# Patient Record
Sex: FEMALE | Race: WHITE | NOT HISPANIC OR LATINO
[De-identification: names, ages, dates, MRNs, and addresses within clinical notes are randomized per-mention and may not be internally consistent; named-entity substitution may affect disease eponyms.]

---

## 2023-12-19 ENCOUNTER — NON-APPOINTMENT (OUTPATIENT)
Age: 63
End: 2023-12-19

## 2023-12-22 ENCOUNTER — APPOINTMENT (OUTPATIENT)
Dept: PAIN MANAGEMENT | Facility: CLINIC | Age: 63
End: 2023-12-22
Payer: MEDICARE

## 2023-12-22 ENCOUNTER — TRANSCRIPTION ENCOUNTER (OUTPATIENT)
Age: 63
End: 2023-12-22

## 2023-12-22 VITALS
HEART RATE: 73 BPM | OXYGEN SATURATION: 98 % | DIASTOLIC BLOOD PRESSURE: 83 MMHG | WEIGHT: 170 LBS | SYSTOLIC BLOOD PRESSURE: 150 MMHG | BODY MASS INDEX: 28.32 KG/M2 | HEIGHT: 65 IN

## 2023-12-22 DIAGNOSIS — I25.10 ATHEROSCLEROTIC HEART DISEASE OF NATIVE CORONARY ARTERY W/OUT ANGINA PECTORIS: ICD-10-CM

## 2023-12-22 DIAGNOSIS — I10 ESSENTIAL (PRIMARY) HYPERTENSION: ICD-10-CM

## 2023-12-22 DIAGNOSIS — E03.9 HYPOTHYROIDISM, UNSPECIFIED: ICD-10-CM

## 2023-12-22 DIAGNOSIS — Z78.9 OTHER SPECIFIED HEALTH STATUS: ICD-10-CM

## 2023-12-22 DIAGNOSIS — E78.00 PURE HYPERCHOLESTEROLEMIA, UNSPECIFIED: ICD-10-CM

## 2023-12-22 DIAGNOSIS — F32.9 MAJOR DEPRESSIVE DISORDER, SINGLE EPISODE, UNSPECIFIED: ICD-10-CM

## 2023-12-22 PROBLEM — Z00.00 ENCOUNTER FOR PREVENTIVE HEALTH EXAMINATION: Status: ACTIVE | Noted: 2023-12-22

## 2023-12-22 PROCEDURE — 99204 OFFICE O/P NEW MOD 45 MIN: CPT

## 2023-12-22 RX ORDER — ROSUVASTATIN CALCIUM 40 MG/1
40 TABLET, FILM COATED ORAL
Refills: 0 | Status: ACTIVE | COMMUNITY

## 2023-12-22 RX ORDER — TIZANIDINE 2 MG/1
2 TABLET ORAL
Qty: 45 | Refills: 0 | Status: ACTIVE | COMMUNITY
Start: 2023-12-22 | End: 1900-01-01

## 2023-12-22 RX ORDER — BUPROPION HYDROCHLORIDE 300 MG/1
300 TABLET, EXTENDED RELEASE ORAL
Refills: 0 | Status: ACTIVE | COMMUNITY

## 2023-12-22 RX ORDER — METOPROLOL SUCCINATE AND HYDROCHLOROTHIAZIDE 12.5; 5 MG/1; MG/1
50-12.5 TABLET ORAL
Refills: 0 | Status: ACTIVE | COMMUNITY

## 2023-12-22 RX ORDER — FLUOXETINE HYDROCHLORIDE 40 MG/1
40 CAPSULE ORAL
Refills: 0 | Status: ACTIVE | COMMUNITY

## 2023-12-22 RX ORDER — LEVOTHYROXINE SODIUM 75 UG/1
75 TABLET ORAL
Refills: 0 | Status: ACTIVE | COMMUNITY

## 2023-12-22 RX ORDER — COLCHICINE 0.6 MG/1
0.6 TABLET ORAL
Refills: 0 | Status: ACTIVE | COMMUNITY

## 2023-12-22 NOTE — ASSESSMENT
[FreeTextEntry1] : 63 yof w/ severe back and leg pain.  I have personally reviewed the patient's MRI in detail and discussed it with them which is significant for previous surgery and multiple levels of canal and foraminal stenosis.  Tizanidine 2 mg q8h prn.  Physical therapy prescribed - goal will be to increase ROM, strengthening, postural training, other modalities ad evens which may include massage and stim. Goals of therapy discussed with the patient in detail and will be discussed with physical therapist. Patient will follow-up following course of physical therapy to monitor progress and adjust therapy as needed.  Acetaminophen 1,000 mg q8h prn for moderate pain. Risks, benefits, and alternatives of acetaminophen discussed with patient.  Ibuprofen 600 mg q8h prn add when pain is not adequately controlled with acetaminophen. Risks, benefits, and alternatives of ibuprofen discussed with patient.  Diet and nutritional strategies discussed which may improve patients pain and will improve overall health.  MARCUS if pain persists.

## 2023-12-22 NOTE — HISTORY OF PRESENT ILLNESS
[Back Pain] : back pain [Constant] : constant [7] : a current pain level of 7/10 [6] : a minimum pain level of 6/10 [10] : a maximum pain level of 10/10 [Throbbing] : throbbing [Burning] : burning [Shooting] : shooting [Electric] : electric [Bending] : bending [Lifting] : lifting [Rest] : rest [FreeTextEntry1] : 63 yof presents w/ back pain. Her pain began in 2008 while she was working as a . In December 2014 she underwent spine surgery and reports feeling great. She had surgery again in August of 2015 and was doing well with that as well. Her pain began again after she moved. She reports that her worst pain is with standing on concrete. Pain radiates down the bilateral lower extremities, right far greater then left. Quality of life is impaired. There has been a severe exacerbation of the patient's chronic pain. She has done physical therapy throughout.

## 2023-12-22 NOTE — DATA REVIEWED
[FreeTextEntry1] : MRI Lumbar Spine: (05/2023):  L1-L2: moderate stenosis L2-L3: moderate central and moderate to severe foraminal stenosis L3-L4: moderate canal and moderate right with moderate to severe left foraminal stenosis L4-L5: annular tear with mild central stenosis with moderate bilateral foraminal stenossi L5-S1: left disc hernation with mild central and moderate left foraminal stenosis

## 2023-12-22 NOTE — PHYSICAL EXAM
[de-identified] : Constitutional: Well-developed, in no acute distress  Musculoskeletal: Lumbar Spine:   Gait: Antalgic 		Inspection: Normal curvature, no abnormal kyphosis or scoliosis 		Facet loading: pain bilaterally 		Palpation: 			Lumbar and paraspinal muscles: pain bilaterally 			Sacroiliac joint: no pain 			Greater trochanter: no pain 		Muscle Strength: 		Iliopsoas: 5/5 bilaterally 		Quadriceps: 5/5 bilaterally 		Hamstrings: 5/5 bilaterally 		Tibialis anterior: 5/5 bilaterally 		Extensor hallucis longus: 5/5 bilaterally  		Sensation: normal and equal in bilateral lower extremities  Extremity: no edema noted Neurological: Memory normal, AAO x 3, Cranial nerves II - XII grossly normal Psychiatric: Appropriate mood and affect, oriented to time, place, person, and situation

## 2024-01-22 ENCOUNTER — APPOINTMENT (OUTPATIENT)
Dept: PAIN MANAGEMENT | Facility: CLINIC | Age: 64
End: 2024-01-22
Payer: MEDICARE

## 2024-01-22 VITALS
WEIGHT: 170 LBS | DIASTOLIC BLOOD PRESSURE: 76 MMHG | HEIGHT: 65 IN | SYSTOLIC BLOOD PRESSURE: 120 MMHG | BODY MASS INDEX: 28.32 KG/M2

## 2024-01-22 PROCEDURE — 99214 OFFICE O/P EST MOD 30 MIN: CPT

## 2024-01-22 RX ORDER — GABAPENTIN 300 MG/1
300 CAPSULE ORAL 3 TIMES DAILY
Qty: 90 | Refills: 0 | Status: ACTIVE | COMMUNITY
Start: 2024-01-22 | End: 1900-01-01

## 2024-01-22 NOTE — PHYSICAL EXAM
[de-identified] : Constitutional: Well-developed, in no acute distress  Musculoskeletal: Lumbar Spine:   Gait: Antalgic 		Inspection: Normal curvature, no abnormal kyphosis or scoliosis 		Facet loading: pain bilaterally 		Palpation: 			Lumbar and paraspinal muscles: pain bilaterally 			Sacroiliac joint: no pain 			Greater trochanter: no pain 		Muscle Strength: 		Iliopsoas: 5/5 bilaterally 		Quadriceps: 5/5 bilaterally 		Hamstrings: 5/5 bilaterally 		Tibialis anterior: 5/5 bilaterally 		Extensor hallucis longus: 5/5 bilaterally  		Sensation: normal and equal in bilateral lower extremities  Extremity: no edema noted Neurological: Memory normal, AAO x 3, Cranial nerves II - XII grossly normal Psychiatric: Appropriate mood and affect, oriented to time, place, person, and situation

## 2024-01-22 NOTE — ASSESSMENT
[FreeTextEntry1] : 63 yof w/ severe back and leg pain. Pain has never been this bad before.   I have personally reviewed the patient's MRI in detail and discussed it with them which is significant for previous surgery and multiple levels of canal and foraminal stenosis.  The patient has failed to have relief with over six weeks of physical therapy within the last three months and all medications. Given their failure to improve with all other conservative measures recommend MRI lumbar spine. Patient will return to review imaging and plan for potential intervention. This is necessary as she now has new and severe pain down the opposite leg as previous.   Start gabapentin 300 mg TID for neuropathic pain.   Tizanidine 2 mg q8h prn.  Physical therapy prescribed - goal will be to increase ROM, strengthening, postural training, other modalities ad evens which may include massage and stim. Goals of therapy discussed with the patient in detail and will be discussed with physical therapist. Patient will follow-up following course of physical therapy to monitor progress and adjust therapy as needed.  Acetaminophen 1,000 mg q8h prn for moderate pain. Risks, benefits, and alternatives of acetaminophen discussed with patient.  Ibuprofen 600 mg q8h prn add when pain is not adequately controlled with acetaminophen. Risks, benefits, and alternatives of ibuprofen discussed with patient.  Diet and nutritional strategies discussed which may improve patients pain and will improve overall health.  Likely plan for MARCUS based on imaging results.

## 2024-01-22 NOTE — HISTORY OF PRESENT ILLNESS
[Back Pain] : back pain [7] : a current pain level of 7/10 [Constant] : constant [6] : a minimum pain level of 6/10 [10] : a maximum pain level of 10/10 [Throbbing] : throbbing [Burning] : burning [Shooting] : shooting [Electric] : electric [Bending] : bending [Lifting] : lifting [Rest] : rest [FreeTextEntry1] : Interval History: Patient returns for follow-up today. Her pain had improved, but then on January 13 her pain became severe again. This is the worst that it has been. Quality of life is impaired. There has been a severe exacerbation of the patient's chronic pain. Pain is now worse on the left then her right, which is rare.   HPI: 63 yof presents w/ back pain. Her pain began in 2008 while she was working as a . In December 2014 she underwent spine surgery and reports feeling great. She had surgery again in August of 2015 and was doing well with that as well. Her pain began again after she moved. She reports that her worst pain is with standing on concrete. Pain radiates down the bilateral lower extremities, right far greater then left. Quality of life is impaired. There has been a severe exacerbation of the patient's chronic pain. She has done physical therapy throughout.

## 2024-02-08 ENCOUNTER — APPOINTMENT (OUTPATIENT)
Dept: PAIN MANAGEMENT | Facility: CLINIC | Age: 64
End: 2024-02-08
Payer: MEDICARE

## 2024-02-08 VITALS
WEIGHT: 170 LBS | SYSTOLIC BLOOD PRESSURE: 122 MMHG | HEIGHT: 65 IN | DIASTOLIC BLOOD PRESSURE: 74 MMHG | BODY MASS INDEX: 28.32 KG/M2

## 2024-02-08 DIAGNOSIS — M79.10 MYALGIA, UNSPECIFIED SITE: ICD-10-CM

## 2024-02-08 DIAGNOSIS — M96.1 POSTLAMINECTOMY SYNDROME, NOT ELSEWHERE CLASSIFIED: ICD-10-CM

## 2024-02-08 PROCEDURE — 99214 OFFICE O/P EST MOD 30 MIN: CPT

## 2024-02-08 NOTE — HISTORY OF PRESENT ILLNESS
[Back Pain] : back pain [Constant] : constant [7] : a current pain level of 7/10 [6] : a minimum pain level of 6/10 [10] : a maximum pain level of 10/10 [Throbbing] : throbbing [Burning] : burning [Shooting] : shooting [Electric] : electric [Bending] : bending [Lifting] : lifting [Rest] : rest [FreeTextEntry1] : Interval History: Patient returns for follow-up today. Her pain had improved, but then on January 13 her pain became severe again. This is the worst that it has been. Quality of life is impaired. There has been a severe exacerbation of the patient's chronic pain. Pain is now worse on the left then her right, which is rare. She is seeing a spine surgeon regarding this.   HPI: 63 yof presents w/ back pain. Her pain began in 2008 while she was working as a . In December 2014 she underwent spine surgery and reports feeling great. She had surgery again in August of 2015 and was doing well with that as well. Her pain began again after she moved. She reports that her worst pain is with standing on concrete. Pain radiates down the bilateral lower extremities, right far greater then left. Quality of life is impaired. There has been a severe exacerbation of the patient's chronic pain. She has done physical therapy throughout.

## 2024-02-08 NOTE — ASSESSMENT
[FreeTextEntry1] : 63 yof w/ severe back and leg pain. Pain has never been this bad before.   I have personally reviewed the patient's MRI in detail and discussed it with them which is significant for previous surgery and multiple levels of canal and foraminal stenosis.  Continue gabapentin 300 mg TID for neuropathic pain.   Tizanidine 2 mg q8h prn.  Agree with seeing spine surgeon.   The patient has failed to have relief with medication management. The patient has failed to have relief with more then six weeks of physical therapy within the last three months. Given the patients failure to improve with all other conservative measures, recommend caudal epidural steroid injection under fluoroscopic guidance. The patient will follow-up with me in my office two weeks following intervention.  I have discussed in detail with the patient that an interventional spine procedure is associated with potential risks. The procedure may include an injection of steroid and potentially other medications (local anesthetic and normal saline) into the epidural space or surrounding tissue of the spine. There are significant risks of this procedure which include and are not limited to infection, bleeding, worsening pain, dural puncture leading to post-dural puncture headache, nerve damage, spinal cord injury, paralysis, stroke, and death. There is a chance that the procedure does not improve their pain. There are risks associated with the steroid being absorbed into the body systemically. These include dysphoria, difficulty sleeping, mood swings, and personality changes. Pre-menopausal women may notice a regularity his in her menstrual cycle for 2-3 months following the injection. Steroids can specifically affect patients with hypertension, diabetes, and peptic ulcers. The procedure may cause a temporary increase in blood pressure and blood glucose, and may adversely affect a peptic ulcer. Other, more rare complications, including avascular necrosis of the joints, glaucoma, and osteoporosis. I have discussed the risks of the procedure at length with the patient, and the potential benefits of pain relief. I have offered alternatives to the procedure. All questions were answered. The patient expressed understanding and wishes to proceed with the procedure.  Physical therapy prescribed - goal will be to increase ROM, strengthening, postural training, other modalities ad evens which may include massage and stim. Goals of therapy discussed with the patient in detail and will be discussed with physical therapist. Patient will follow-up following course of physical therapy to monitor progress and adjust therapy as needed.  Acetaminophen 1,000 mg q8h prn for moderate pain. Risks, benefits, and alternatives of acetaminophen discussed with patient.  Ibuprofen 600 mg q8h prn add when pain is not adequately controlled with acetaminophen. Risks, benefits, and alternatives of ibuprofen discussed with patient.  Diet and nutritional strategies discussed which may improve patients pain and will improve overall health.

## 2024-02-08 NOTE — PHYSICAL EXAM
[de-identified] : Constitutional: Well-developed, in no acute distress  Musculoskeletal: Lumbar Spine:   Gait: Antalgic 		Inspection: Normal curvature, no abnormal kyphosis or scoliosis 		Facet loading: pain bilaterally 		Palpation: 			Lumbar and paraspinal muscles: pain bilaterally 			Sacroiliac joint: no pain 			Greater trochanter: no pain 		Muscle Strength: 		Iliopsoas: 5/5 bilaterally 		Quadriceps: 5/5 bilaterally 		Hamstrings: 5/5 bilaterally 		Tibialis anterior: 5/5 bilaterally 		Extensor hallucis longus: 5/5 bilaterally  		Sensation: normal and equal in bilateral lower extremities  Extremity: no edema noted Neurological: Memory normal, AAO x 3, Cranial nerves II - XII grossly normal Psychiatric: Appropriate mood and affect, oriented to time, place, person, and situation

## 2024-02-26 ENCOUNTER — APPOINTMENT (OUTPATIENT)
Dept: PAIN MANAGEMENT | Facility: CLINIC | Age: 64
End: 2024-02-26
Payer: MEDICARE

## 2024-02-26 VITALS
BODY MASS INDEX: 28.32 KG/M2 | SYSTOLIC BLOOD PRESSURE: 132 MMHG | OXYGEN SATURATION: 98 % | HEART RATE: 63 BPM | RESPIRATION RATE: 16 BRPM | WEIGHT: 170 LBS | HEIGHT: 65 IN | DIASTOLIC BLOOD PRESSURE: 74 MMHG

## 2024-02-26 PROCEDURE — 62323 NJX INTERLAMINAR LMBR/SAC: CPT

## 2024-02-26 NOTE — PROCEDURE
[FreeTextEntry1] : PROCEDURE: Caudal epidural steroid injection under fluoroscopic guidance.  CHIEF COMPLAINT: Low back and leg pain.    PREOPERATIVE DIAGNOSIS:	Lumbar radiculopathy.  POSTOPERATIVE DIAGNOSIS:  Lumbar radiculopathy. 	  ANESTHESIA:  Local.  COMPLICATIONS:  	None.  ESTIMATED BLOOD LOSS:  None.	  INDICATIONS: Mrs. Cronin is a very pleasant 63 yof who has significant low back and leg pain.  The patient has failed to have relief with medication management and physical therapy. Given their failure to improve with all other conservative measures, it was determined that the patient would benefit from a caudal epidural steroid injection under fluoroscopic guidance.    The patient denies any fevers, chills, nausea, vomiting, diarrhea, constipation, chest pain, shortness of breath, or burning on urination.  They deny any latex allergy.  They are not taking any anticoagulants and do not have a history of coagulopathy.  The patient denies any IV contrast allergy.       PROCEDURE COURSE: The risks, benefits, and alternatives of the caudal epidural steroid injection were explained to the patient.  All questions were answered to their own satisfaction.  Written consent was signed and placed in the chart. The patients low back was marked in the preoperative holding area.   The patient was brought to the Operating Room and placed in the prone position with a pillow under the abdomen to reduce lumbar lordosis.  A time-out was taken identifying the patient, the procedure, the site and side, and the patients allergies.  ASA standard monitors were applied for monitoring throughout the procedure.  The patients back was prepped and draped with Chloroprep in the usual sterile fashion and sterile technique was adhered to throughout the entire procedure.  The lumbar spine was visualized under fluoroscopy in the AP view. The 12th rib and T12 vertebra were identified as a reference point and the lumbar vertebral bodies were counted.  The L5 vertebral body was then squared. The vertebral body and the superior and inferior end plates were aligned and the spinous processes were adjusted until midline. The sacral hiatus space was identified under fluoroscopic guidance and with manual palpation. The skin and subcutaneous tissues were infiltrated with 1% Lidocaine. After adequate local anesthesia was obtained, a 22 gauge quincke needle was advanced into the caudal space. This was confirmed under fluoroscopy in the lateral view. The needle was carefully advanced, alternating between AP and lateral views, to ensure appropriate trajectory and depth. Once the caudal space was accessed, negative aspiration for hem and CSF was obtained, further confirming location. Following this, 1 cc of contrast was administered and epidural spread was confirmed in the AP and lateral views. Following this, 80 mg of methylprednisolone and 2 cc of 1% lidocaine was slowly administered in incremental amounts.  All injections were done with negative aspiration for cerebrospinal fluid or heme, and all needles were withdrawn without incident. I personally met with the patient following the procedure and all questions were answered. The patient tolerated the procedure well without any apparent complications and was transferred to the Recovery Room in stable condition. The patient was provided with discharge instructions and will follow-up with me in my office.  	___________________________________ 	Chin Bell M.D.

## 2024-03-12 ENCOUNTER — APPOINTMENT (OUTPATIENT)
Dept: PAIN MANAGEMENT | Facility: CLINIC | Age: 64
End: 2024-03-12
Payer: MEDICARE

## 2024-03-12 VITALS
DIASTOLIC BLOOD PRESSURE: 90 MMHG | SYSTOLIC BLOOD PRESSURE: 146 MMHG | WEIGHT: 170 LBS | BODY MASS INDEX: 28.32 KG/M2 | HEART RATE: 70 BPM | HEIGHT: 65 IN | OXYGEN SATURATION: 99 %

## 2024-03-12 PROCEDURE — 99214 OFFICE O/P EST MOD 30 MIN: CPT

## 2024-03-12 NOTE — HISTORY OF PRESENT ILLNESS
[Back Pain] : back pain [Constant] : constant [7] : a current pain level of 7/10 [6] : a minimum pain level of 6/10 [10] : a maximum pain level of 10/10 [Throbbing] : throbbing [Shooting] : shooting [Burning] : burning [Electric] : electric [Bending] : bending [Lifting] : lifting [Rest] : rest [FreeTextEntry1] : Interval History: Patient returns for follow-up today. She has a new MRI and has seen Dr. Calvo's PA recently. Quality of life is impaired. There has been a severe exacerbation of the patient's chronic pain. She had 75% relief from her caudal MARCUS. She is seeing Dr. Tucker next week.   HPI: 63 yof presents w/ back pain. Her pain began in 2008 while she was working as a . In December 2014 she underwent spine surgery and reports feeling great. She had surgery again in August of 2015 and was doing well with that as well. Her pain began again after she moved. She reports that her worst pain is with standing on concrete. Pain radiates down the bilateral lower extremities, right far greater then left. Quality of life is impaired. There has been a severe exacerbation of the patient's chronic pain. She has done physical therapy throughout.   Interventions: Caudal (02/26/24):

## 2024-03-12 NOTE — DATA REVIEWED
[FreeTextEntry1] : EXAM: MR LUMBAR SPINE WITHOUT AND WITH CONTRAST  FINDINGS: Comparison: 1/26/2024. There is mild levoscoliosis. The height and alignment of the vertebral bodies are intact.  Pedicles are intact. There are right-sided postsurgical changes at L3-4 and L4-5.   Bone marrow signal is uniform.   T11-T12: Intervertebral disc intact.  T12-L1: Intervertebral disc intact.  L1-2: There is early endplate osteophyte formation and early facet osteoarthritis resulting in mild canal stenosis.  L2-3: There is early disc degeneration with endplate osteophyte formation and bilateral facet osteoarthritis. There is moderate to severe canal stenosis. There is mild neural foraminal stenosis.   There is  L3-4: There is early disc degeneration with anterior annular tear.   There is left greater than right facet osteoarthritis. There is moderate canal and neural foraminal stenosis.  L4-5: There is moderate disc degeneration with posterior annular tear. There is left greater than right facet osteoarthritis.  L5-S1: There is trace endplate osteophyte formation. There is early facet osteoarthritis.   There is no disc herniation or epidural mass.  There are no intradural masses. There is no nerve root clumping. There is no pathologic enhancement. The conus medullaris tapers normally. There is no paraspinal mass. The aorta is non-dilated.   IMPRESSION: Mild levoscoliosis. Right-sided postsurgical changes. Disc degeneration most severe L4-5. Annular tears at L3-4 and L4-5. Multilevel facet osteoarthritis. Canal stenosis most severe at L2-3 (moderate to severe). Mild to moderate neural foraminal stenosis without focal nerve root impingement. No disc herniation, fracture or arachnoiditis. Findings have not significantly changed since the prior study.

## 2024-03-12 NOTE — PHYSICAL EXAM
[de-identified] : Constitutional: Well-developed, in no acute distress  Musculoskeletal: Lumbar Spine:   Gait: Antalgic 		Inspection: Normal curvature, no abnormal kyphosis or scoliosis 		Facet loading: pain bilaterally 		Palpation: 			Lumbar and paraspinal muscles: pain bilaterally 			Sacroiliac joint: no pain 			Greater trochanter: no pain 		Muscle Strength: 		Iliopsoas: 5/5 bilaterally 		Quadriceps: 5/5 bilaterally 		Hamstrings: 5/5 bilaterally 		Tibialis anterior: 5/5 bilaterally 		Extensor hallucis longus: 5/5 bilaterally  		Sensation: normal and equal in bilateral lower extremities  Extremity: no edema noted Neurological: Memory normal, AAO x 3, Cranial nerves II - XII grossly normal Psychiatric: Appropriate mood and affect, oriented to time, place, person, and situation

## 2024-03-12 NOTE — ASSESSMENT
[FreeTextEntry1] : 63 yof w/ severe back and leg pain which has improved 75% with MARCUS.   I have personally reviewed the patient's MRI in detail and discussed it with them which is significant for previous surgery and multiple levels of canal and foraminal stenosis. There is moderate to severe canal stenosis at L2-L3.  Continue gabapentin 300 mg TID for neuropathic pain.   Tizanidine 2 mg q8h prn.  Agree with seeing spine surgeon.   Plan for left L2-L3 and L3-L4 TFESI if pain returns.  I have discussed in detail with the patient that an interventional spine procedure is associated with potential risks. The procedure may include an injection of steroid and potentially other medications (local anesthetic and normal saline) into the epidural space or surrounding tissue of the spine. There are significant risks of this procedure which include and are not limited to infection, bleeding, worsening pain, dural puncture leading to post-dural puncture headache, nerve damage, spinal cord injury, paralysis, stroke, and death. There is a chance that the procedure does not improve their pain. There are risks associated with the steroid being absorbed into the body systemically. These include dysphoria, difficulty sleeping, mood swings, and personality changes. Pre-menopausal women may notice a regularity his in her menstrual cycle for 2-3 months following the injection. Steroids can specifically affect patients with hypertension, diabetes, and peptic ulcers. The procedure may cause a temporary increase in blood pressure and blood glucose, and may adversely affect a peptic ulcer. Other, more rare complications, including avascular necrosis of the joints, glaucoma, and osteoporosis. I have discussed the risks of the procedure at length with the patient, and the potential benefits of pain relief. I have offered alternatives to the procedure. All questions were answered. The patient expressed understanding and wishes to proceed with the procedure.  Physical therapy prescribed - goal will be to increase ROM, strengthening, postural training, other modalities ad evens which may include massage and stim. Goals of therapy discussed with the patient in detail and will be discussed with physical therapist. Patient will follow-up following course of physical therapy to monitor progress and adjust therapy as needed.  Acetaminophen 1,000 mg q8h prn for moderate pain. Risks, benefits, and alternatives of acetaminophen discussed with patient.  Ibuprofen 600 mg q8h prn add when pain is not adequately controlled with acetaminophen. Risks, benefits, and alternatives of ibuprofen discussed with patient.  Diet and nutritional strategies discussed which may improve patients pain and will improve overall health.

## 2024-03-18 NOTE — PHYSICAL EXAM
[General Appearance - Alert] : alert [General Appearance - In No Acute Distress] : in no acute distress [Fluency] : fluency intact [Comprehension] : comprehension intact [Cranial Nerves Oculomotor (III)] : extraocular motion intact [Cranial Nerves Optic (II)] : visual acuity intact bilaterally,  pupils equal round and reactive to light [Cranial Nerves Trigeminal (V)] : facial sensation intact symmetrically [Cranial Nerves Facial (VII)] : face symmetrical [Cranial Nerves Vestibulocochlear (VIII)] : hearing was intact bilaterally [Cranial Nerves Glossopharyngeal (IX)] : tongue and palate midline [Cranial Nerves Accessory (XI - Cranial And Spinal)] : head turning and shoulder shrug symmetric [Cranial Nerves Hypoglossal (XII)] : there was no tongue deviation with protrusion [Motor Strength] : muscle strength was normal in all four extremities [Sensation Tactile Decrease] : light touch was intact [Abnormal Walk] : normal gait

## 2024-03-19 ENCOUNTER — APPOINTMENT (OUTPATIENT)
Dept: NEUROSURGERY | Facility: CLINIC | Age: 64
End: 2024-03-19
Payer: MEDICARE

## 2024-03-19 VITALS
WEIGHT: 161 LBS | OXYGEN SATURATION: 97 % | BODY MASS INDEX: 26.82 KG/M2 | HEART RATE: 74 BPM | DIASTOLIC BLOOD PRESSURE: 89 MMHG | HEIGHT: 65 IN | SYSTOLIC BLOOD PRESSURE: 153 MMHG

## 2024-03-19 PROCEDURE — 99205 OFFICE O/P NEW HI 60 MIN: CPT

## 2024-03-26 ENCOUNTER — TRANSCRIPTION ENCOUNTER (OUTPATIENT)
Age: 64
End: 2024-03-26

## 2024-03-28 NOTE — HISTORY OF PRESENT ILLNESS
[de-identified] : HILLARY FERRARA is a 63 year old female with a PMH of L3, L4, L5 spine surgery 2014 and 2015 (for back pain and tightness in legs), who presents to the office today for neurosurgical consultation at the request of Dr. Bell due to lumbar radiculopathy.   The pain is characterized as throbbing, burning, electric in nature.  It started over nine years ago. It is exacerbated by standing on concrete, prolonged walking, bending, lifting, worse in the morning, and relieved by rest and sitting in recliner.  It radiates down the lower back, gluteal, to bilateral lower extremities anterior to knee mostly and numbness in her toes, left greater than right. She has to sleep on right side. She can no longer walk prolonged distances without her legs feeling fatigued and has difficulty caring for her grandchildren. There has been no known trauma precipitating the pain.  The patient endorses numbness of extremities. Denies weakness of extremities, bowel or bladder incontinence and gait disturbance.  She has tried MARCUS (left L2-L3, L3-L4 on 2/26/24), PT and PT home exercises, and pain medications including Tizanidine, Ibuprofen, Tylenol, Gabapentin, in the last months without relief.  She has undergone imaging in the form of MRI lumbar spine which revealed areas of stenosis, most notable at the L2-L3, L3-L4 levels (see detailed report below)  Surg Hx: Previous lumbar laminectomies and discectomies (right sided L3-4 and L4-5) Meds:  Gabapentin, Tizanidine, Buproprian, Colchicine, Fluoxetine, Metoprolol-HCTZ, Rosuvastatin, Synthroid  Allergies: NKDA  Soc Hx: nonsmoker, occasional EtOH, lives with , works as  retired in 2018

## 2024-03-28 NOTE — ASSESSMENT
[FreeTextEntry1] : HILLARY FERRARA is a 63 year old female with a history of prior lumbar decompression surgery who is presenting due to lumbar radiculopathy and neurogenic claudication. For the last 9 years she has been suffering from pain in her low back that radiates to her bilateral lower extremities and into her toes (left greater than right).  She feels that she is limited in the distance that she can walk after which she needs to rest due to her legs feeling fatigued.  She has had difficulty caring for her grandchildren.  She has a history of lumbar decompression surgeries in 2014 and 2015 (L3-4 and L4-5) by another surgeon.   We reviewed her MRI and x-rays in the office today using the spine model to aid in interpretation.  Her MRI demonstrates multilevel lumbar spondylosis with mild degenerative levoscoliosis as well as postsurgical changes on the right side at L3-4 and L4-5.  She has severe spinal canal stenosis secondary to facet arthropathy and disc herniation as well as mild to moderate neuroforaminal stenosis at L2-3. At L3-4 there is an annular tear and disc protrusion as well as facet arthropathy leading to moderate canal and neuroforaminal stenosis.  She also has lumbar spine x-rays again demonstrating multilevel lumbar spondylosis.  There is a grade 1 L3 on L4 anterolisthesis with flexion which reduces on extension.  I have discussed the natural history and treatment options for neurogenic claudication and radiculopathy due to lumbar spinal stenosis with the patient. I explained the indications for observation, conservative management, medical management, physical therapy, pain management approaches and surgery. I explained the different types and surgical approaches including anterior and posterior approaches as well as decompression only procedures and instrumented fusions. I discussed possible surgeries including lumbar L2-L4 laminectomy, discectomy, and facetectomy +/- instrumented fusion.    In the end, I recommend additional imaging in the form of CT lumbar spine to better visualize the extent of osteophyte complex development, facet arthropathy, and post-surgical changes.   The patient may return to the office once imaging completed for further treatment planning. She was also advised to reach out to us sooner if she is to develop weakness or bowel/bladder symptoms.  I spent 60 minutes relative to this patient encounter.

## 2024-03-28 NOTE — CONSULT LETTER
[Dear  ___] : Dear  [unfilled], [Consult Letter:] : I had the pleasure of evaluating your patient, [unfilled]. [Consult Closing:] : Thank you very much for allowing me to participate in the care of this patient.  If you have any questions, please do not hesitate to contact me. [Please see my note below.] : Please see my note below. [Sincerely,] : Sincerely, [FreeTextEntry3] : Alfie Tucker M.D.

## 2024-03-30 ENCOUNTER — RESULT REVIEW (OUTPATIENT)
Age: 64
End: 2024-03-30

## 2024-03-31 NOTE — PHYSICAL EXAM
[General Appearance - Alert] : alert [General Appearance - In No Acute Distress] : in no acute distress [Comprehension] : comprehension intact [Fluency] : fluency intact [Cranial Nerves Oculomotor (III)] : extraocular motion intact [Cranial Nerves Optic (II)] : visual acuity intact bilaterally,  pupils equal round and reactive to light [Cranial Nerves Vestibulocochlear (VIII)] : hearing was intact bilaterally [Cranial Nerves Trigeminal (V)] : facial sensation intact symmetrically [Cranial Nerves Facial (VII)] : face symmetrical [Cranial Nerves Glossopharyngeal (IX)] : tongue and palate midline [Cranial Nerves Accessory (XI - Cranial And Spinal)] : head turning and shoulder shrug symmetric [Cranial Nerves Hypoglossal (XII)] : there was no tongue deviation with protrusion [Motor Strength] : muscle strength was normal in all four extremities [Sensation Tactile Decrease] : light touch was intact [Abnormal Walk] : normal gait

## 2024-04-02 ENCOUNTER — APPOINTMENT (OUTPATIENT)
Dept: NEUROSURGERY | Facility: CLINIC | Age: 64
End: 2024-04-02
Payer: MEDICARE

## 2024-04-02 VITALS
BODY MASS INDEX: 26.82 KG/M2 | DIASTOLIC BLOOD PRESSURE: 87 MMHG | SYSTOLIC BLOOD PRESSURE: 136 MMHG | WEIGHT: 161 LBS | OXYGEN SATURATION: 99 % | HEIGHT: 65 IN | HEART RATE: 75 BPM

## 2024-04-02 DIAGNOSIS — M47.817 SPONDYLOSIS W/OUT MYELOPATHY OR RADICULOPATHY, LUMBOSACRAL REGION: ICD-10-CM

## 2024-04-02 PROCEDURE — 99215 OFFICE O/P EST HI 40 MIN: CPT

## 2024-04-02 NOTE — CONSULT LETTER
[Dear  ___] : Dear  [unfilled], [Please see my note below.] : Please see my note below. [Consult Letter:] : I had the pleasure of evaluating your patient, [unfilled]. [Consult Closing:] : Thank you very much for allowing me to participate in the care of this patient.  If you have any questions, please do not hesitate to contact me. [Sincerely,] : Sincerely, [FreeTextEntry3] : Alfie Tucker M.D.

## 2024-04-02 NOTE — HISTORY OF PRESENT ILLNESS
[de-identified] : Baylee Ferrara presents for interval follow up and imaging review for lumbar radiculopathy. She continues to have severe pain in her lower back, left greater than right side, radiating into her thighs. Her numbness partially improved with MARCUS. She additionally has the pain radiating down her posterior left leg and into the foot. Denies bowel or bladder difficulties.  She has been doing yoga and home PT exercises.   She underwent CT lumbar spine.  She underwent x-rays of the lumbar spine which demonstrates multilevel mild to moderate spondylosis. Mild grade 1 anterolisthesis of L3 on L4 with flexion which decreases slightly with extension.   3/19/24: BAYLEE FERRARA is a 63 year old female with a PMH of L3, L4, L5 spine surgery 2014 and 2015 (for back pain and tightness in legs), who presents to the office today for neurosurgical consultation at the request of Dr. Bell due to lumbar radiculopathy.   The pain is characterized as throbbing, burning, electric in nature.  It started over nine years ago. It is exacerbated by standing on concrete, prolonged walking, bending, lifting, worse in the morning, and relieved by rest and sitting in recliner.  It radiates down the lower back, gluteal, to bilateral lower extremities anterior to knee mostly and numbness in her toes, left greater than right. She has to sleep on right side. She can no longer walk prolonged distances without her legs feeling fatigued and has difficulty caring for her grandchildren. There has been no known trauma precipitating the pain.  The patient endorses numbness of extremities. Denies weakness of extremities, bowel or bladder incontinence and gait disturbance.  She has tried MARCUS (left L2-L3, L3-L4 on 2/26/24), PT and PT home exercises, and pain medications including Tizanidine, Ibuprofen, Tylenol, Gabapentin, in the last months without relief.  She has undergone imaging in the form of MRI lumbar spine which revealed areas of stenosis, most notable at the L2-L3, L3-L4 levels (see detailed report below)  cardiologist: Gerardo Clay [facility]   Surg Hx: Previous lumbar laminectomies and discectomies (right sided L3-4 and L4-5) Meds:  Buproprian, Colchicine, Fluoxetine, Metoprolol 50 mg, Rosuvastatin 20 mg, Synthroid, Repatha, Vitamin D Allergies: NKDA  Soc Hx: nonsmoker, occasional EtOH, lives with , works as  retired in 2018

## 2024-04-02 NOTE — ASSESSMENT
[FreeTextEntry1] : HILLARY FERRARA is a 63 year old female with a history of prior lumbar decompression surgery in 2014 and 2015 (L3-4 and L4-5) who is presenting due to lumbar radiculopathy and neurogenic claudication. For the last 9 years she has been suffering from pain in her low back that radiates to her bilateral lower extremities and into her toes (left greater than right). The most severe pain currently is into her anterior thigh however she also has pain down her posterolateral left leg. She feels that she is limited in the distance that she can walk after which she needs to rest due to her legs feeling fatigued. She also notices the inability to stand for prolonged periods of time. She has had difficulty caring for her grandchildren.  We reviewed her MRI, CT and x-rays in the office today using the spine model to aid in interpretation. Her MRI demonstrates multilevel lumbar spondylosis with mild degenerative levoscoliosis as well as postsurgical changes on the right side at L3-4 and L4-5. She has severe spinal canal stenosis secondary to facet arthropathy and disc herniation as well as mild to moderate neuroforaminal stenosis at L2-3. At L3-4 there is an annular tear and disc protrusion as well as facet arthropathy leading to moderate canal and neuroforaminal stenosis.  At L4-5 there is moderate degenerative disc disease with posterior disc herniation as well as significant facet arthropathy leading to left greater than right foraminal stenosis.  She also has lumbar spine x-rays again demonstrating multilevel lumbar spondylosis.  There is a grade 1 L3 on L4 anterolisthesis with flexion which reduces on extension.  Her CT scan redemonstrates the multilevel lumbar spondylosis as well as postsurgical changes and severe facet arthropathy at L2-L5.  I have discussed the natural history and treatment options for neurogenic claudication and radiculopathy due to lumbar spinal stenosis with the patient. I explained the indications for observation, conservative management, medical management, physical therapy, pain management approaches and surgery. I explained the different types and surgical approaches including anterior and posterior approaches as well as decompression only procedures and instrumented fusions.  The risks of surgery were discussed in detail including but not limited to postoperative infection at the surgical site, hospital acquired pneumonia, hospital acquired urinary tract infection, postoperative meningitis, wound dehiscence, CSF leak, stroke (ischemic and hemorrhagic), postoperative seizures, worsening motor function due to spinal cord or nerve injury, postoperative visual deficit which could be permanent (blindness) when surgery is performed in a prone position, cardiovascular complications (MI, PE, DVT) and I also explained that some of these complications could lead to sepsis, coma or even death.  In the end, I recommend a lumbar L2-L5 posterolateral instrumented fusion and arthrodesis with L2-5 bilateral laminectomies, foraminotomies, facetectomies ,and an L2-3 discectomy.  The patient will need cardiac clearance (sees cardiologist in [place], [place]) and will need NP clearance with the Presurgical Testing Department at [place] prior to the procedure.  At the end of the discussion, the patient opted to move forward with the above plan.  Our office will reach out to coordinate a surgical date in the coming weeks.  The patient understands the plan of care and is in agreement.  All questions answered to patient satisfaction.   I spent 45 minutes relative to this patient encounter.

## 2024-04-02 NOTE — DATA REVIEWED
[de-identified] : 3/30 MRI lumbar spine [de-identified] : 1/29/24 MRI lumbar spine-see report  [de-identified] : 2/24/24 xrays lumbar spine-see scanned report

## 2024-04-11 ENCOUNTER — RESULT REVIEW (OUTPATIENT)
Age: 64
End: 2024-04-11

## 2024-04-24 ENCOUNTER — RESULT REVIEW (OUTPATIENT)
Age: 64
End: 2024-04-24

## 2024-04-24 ENCOUNTER — APPOINTMENT (OUTPATIENT)
Dept: NEUROSURGERY | Facility: HOSPITAL | Age: 64
End: 2024-04-24

## 2024-04-28 ENCOUNTER — TRANSCRIPTION ENCOUNTER (OUTPATIENT)
Age: 64
End: 2024-04-28

## 2024-05-01 NOTE — PHYSICAL EXAM
[General Appearance - Alert] : alert [General Appearance - In No Acute Distress] : in no acute distress [Fluency] : fluency intact [Comprehension] : comprehension intact [Cranial Nerves Optic (II)] : visual acuity intact bilaterally,  pupils equal round and reactive to light [Cranial Nerves Oculomotor (III)] : extraocular motion intact [Cranial Nerves Trigeminal (V)] : facial sensation intact symmetrically [Cranial Nerves Facial (VII)] : face symmetrical [Cranial Nerves Vestibulocochlear (VIII)] : hearing was intact bilaterally [Cranial Nerves Glossopharyngeal (IX)] : tongue and palate midline [Cranial Nerves Accessory (XI - Cranial And Spinal)] : head turning and shoulder shrug symmetric [Cranial Nerves Hypoglossal (XII)] : there was no tongue deviation with protrusion [Motor Strength] : muscle strength was normal in all four extremities [Sensation Tactile Decrease] : light touch was intact [Abnormal Walk] : normal gait

## 2024-05-03 ENCOUNTER — APPOINTMENT (OUTPATIENT)
Dept: NEUROSURGERY | Facility: CLINIC | Age: 64
End: 2024-05-03
Payer: MEDICARE

## 2024-05-03 DIAGNOSIS — Z98.890 OTHER SPECIFIED POSTPROCEDURAL STATES: ICD-10-CM

## 2024-05-03 DIAGNOSIS — G89.18 OTHER ACUTE POSTPROCEDURAL PAIN: ICD-10-CM

## 2024-05-03 PROCEDURE — 99024 POSTOP FOLLOW-UP VISIT: CPT

## 2024-05-03 RX ORDER — OXYCODONE 5 MG/1
5 TABLET ORAL
Qty: 42 | Refills: 0 | Status: ACTIVE | COMMUNITY
Start: 2024-05-03 | End: 1900-01-01

## 2024-05-03 NOTE — DATA REVIEWED
[de-identified] : 3/30 MRI lumbar spine [de-identified] : 1/29/24 MRI lumbar spine-see report  [de-identified] : 2/24/24 xrays lumbar spine-see scanned report

## 2024-05-03 NOTE — CONSULT LETTER
[Dear  ___] : Dear  [unfilled], [Consult Letter:] : I had the pleasure of evaluating your patient, [unfilled]. [Please see my note below.] : Please see my note below. [Consult Closing:] : Thank you very much for allowing me to participate in the care of this patient.  If you have any questions, please do not hesitate to contact me. [Sincerely,] : Sincerely, [FreeTextEntry3] : Alfie Tucker M.D.

## 2024-05-03 NOTE — REASON FOR VISIT
[de-identified] : Lumbar L2-L5 laminectomies, facetectomies, and foraminotomies, L2-3 discectomy, L2-5 posterolateral instrumented fusion and arthrodesis  [de-identified] : 4/24/24  [de-identified] : 9

## 2024-05-03 NOTE — ASSESSMENT
[FreeTextEntry1] : HILLARY FERRARA is a 63 year old female with a history of prior lumbar decompression surgery in 2014 and 2015 (L3-4 and L4-5) who initially presented to my office due to lumbar radiculopathy and neurogenic claudication. For the last 9 years she has been suffering from pain in her low back that radiates to her bilateral lower extremities and into her toes (left greater than right). The most severe pain currently is into her anterior thigh however she also has pain down her posterolateral left leg. She feels that she is limited in the distance that she can walk after which she needs to rest due to her legs feeling fatigued. She also notices the inability to stand for prolonged periods of time. She has had difficulty caring for her grandchildren. She is now s/p Lumbar L2-L5 laminectomies, facetectomies, and foraminotomies, L2-3 discectomy, L2-5 posterolateral instrumented fusion and arthrodesis. She is noted to have weak, but present, rectal tone on exam, with diminished sensation.   We will continue to monitor her rectal tone and brenna-anal sensation. She has been doing well since her lumbar L2-L5 laminectomies, facetectomies, and foraminotomies, L2-5 posterolateral instrumented fusion and arthrodesis on 4/24/24.  She was instructed to continue using fragrance-free shampoo to clean incision twice daily. She should continue the current pain regimen.  I have provided a prescription for outpatient PT.  She should return to the office in six weeks time for a progress check with xrays prior. We will touch base next week as well via phone.  The patient understands the plan of care and is in agreement with it.    I spent 30 minutes relative to this patient encounter.

## 2024-05-03 NOTE — HISTORY OF PRESENT ILLNESS
[de-identified] : Baylee Ferrara presents s/p Lumbar L2-L5 laminectomies, facetectomies, and foraminotomies, L2-5 posterolateral instrumented fusion and arthrodesis on 4/24/24. She has been doing well post op and taking the pain medication with positive relief. She notes numbness in the perianal area with difficulty going to the bathroom requiring multiple laxatives. Denies incontinence of bowel or bladder.  The patient denies new pain, numbness, tingling, weakness, gait disturbance, fever, chills, drainage from incision, redness at incision site.  4/2/24: Baylee Ferrara presents for interval follow up and imaging review for lumbar radiculopathy. She continues to have severe pain in her lower back, left greater than right side, radiating into her thighs. Her numbness partially improved with MARCUS. She additionally has the pain radiating down her posterior left leg and into the foot. Denies bowel or bladder difficulties.  She has been doing yoga and home PT exercises.   She underwent CT lumbar spine.  She underwent x-rays of the lumbar spine which demonstrates multilevel mild to moderate spondylosis. Mild grade 1 anterolisthesis of L3 on L4 with flexion which decreases slightly with extension.   3/19/24: BAYLEE FERRARA is a 63 year old female with a PMH of L3, L4, L5 spine surgery 2014 and 2015 (for back pain and tightness in legs), who presents to the office today for neurosurgical consultation at the request of Dr. Bell due to lumbar radiculopathy.   The pain is characterized as throbbing, burning, electric in nature.  It started over nine years ago. It is exacerbated by standing on concrete, prolonged walking, bending, lifting, worse in the morning, and relieved by rest and sitting in recliner.  It radiates down the lower back, gluteal, to bilateral lower extremities anterior to knee mostly and numbness in her toes, left greater than right. She has to sleep on right side. She can no longer walk prolonged distances without her legs feeling fatigued and has difficulty caring for her grandchildren. There has been no known trauma precipitating the pain.  The patient endorses numbness of extremities. Denies weakness of extremities, bowel or bladder incontinence and gait disturbance.  She has tried MARCUS (left L2-L3, L3-L4 on 2/26/24), PT and PT home exercises, and pain medications including Tizanidine, Ibuprofen, Tylenol, Gabapentin, in the last months without relief.  She has undergone imaging in the form of MRI lumbar spine which revealed areas of stenosis, most notable at the L2-L3, L3-L4 levels (see detailed report below)  cardiologist: Gerardo [facility]   Surg Hx: Previous lumbar laminectomies and discectomies (right sided L3-4 and L4-5) Meds:  Buproprian, Colchicine, Fluoxetine, Metoprolol 50 mg, Rosuvastatin 20 mg, Synthroid, Repatha, Vitamin D Allergies: NKDA  Soc Hx: nonsmoker, occasional EtOH, lives with , works as  retired in 2018

## 2024-05-08 RX ORDER — METHOCARBAMOL 500 MG/1
500 TABLET, FILM COATED ORAL
Qty: 56 | Refills: 0 | Status: ACTIVE | COMMUNITY
Start: 2024-05-08 | End: 1900-01-01

## 2024-06-10 ENCOUNTER — NON-APPOINTMENT (OUTPATIENT)
Age: 64
End: 2024-06-10

## 2024-06-11 ENCOUNTER — RESULT REVIEW (OUTPATIENT)
Age: 64
End: 2024-06-11

## 2024-06-11 ENCOUNTER — APPOINTMENT (OUTPATIENT)
Dept: NEUROSURGERY | Facility: CLINIC | Age: 64
End: 2024-06-11
Payer: MEDICARE

## 2024-06-11 VITALS
SYSTOLIC BLOOD PRESSURE: 135 MMHG | BODY MASS INDEX: 26.63 KG/M2 | HEART RATE: 78 BPM | WEIGHT: 156 LBS | HEIGHT: 64 IN | DIASTOLIC BLOOD PRESSURE: 86 MMHG | OXYGEN SATURATION: 98 %

## 2024-06-11 DIAGNOSIS — M54.16 RADICULOPATHY, LUMBAR REGION: ICD-10-CM

## 2024-06-11 DIAGNOSIS — Z98.1 ARTHRODESIS STATUS: ICD-10-CM

## 2024-06-11 DIAGNOSIS — G89.4 CHRONIC PAIN SYNDROME: ICD-10-CM

## 2024-06-11 DIAGNOSIS — M48.062 SPINAL STENOSIS, LUMBAR REGION WITH NEUROGENIC CLAUDICATION: ICD-10-CM

## 2024-06-11 PROCEDURE — 99024 POSTOP FOLLOW-UP VISIT: CPT

## 2024-06-11 NOTE — DATA REVIEWED
[de-identified] : 3/30 MRI lumbar spine [de-identified] : 1/29/24 MRI lumbar spine-see report  [de-identified] : 2/24/24 xrays lumbar spine-see scanned report

## 2024-06-11 NOTE — REASON FOR VISIT
[de-identified] : 7 [de-identified] : Lumbar L2-L5 laminectomies, facetectomies, and foraminotomies, L2-3 discectomy, L2-5 posterolateral instrumented fusion and arthrodesis  [de-identified] : 4/24/24  [de-identified] : 7

## 2024-06-11 NOTE — HISTORY OF PRESENT ILLNESS
[de-identified] : Baylee Ferrara presents s/p lumbar L2-L5 laminectomies, facetectomies, and foraminotomies, L2-L5 posterolateral instrumented fusion and arthrodesis on 4/24/24. The numbness in the perianal area has partially improved since previous visit. We reviewed her repeat x-rays that were obtained prior to her appointment today. She has not yet started outpatient PT. She suffered a fall without injury two weeks ago. She reports tingling in her right thigh and lower extremities and tingling in left arm. The patient denies new pain, numbness, tingling, weakness, bowel/bladder dysfunction, gait disturbance, fever, chills, drainage from incision, redness at incision site.  5/3/24: Baylee Ferrara presents s/p Lumbar L2-L5 laminectomies, facetectomies, and foraminotomies, L2-5 posterolateral instrumented fusion and arthrodesis on 4/24/24. She has been doing well post op and taking the pain medication with positive relief. She notes numbness in the perianal area with difficulty going to the bathroom requiring multiple laxatives. Denies incontinence of bowel or bladder.  The patient denies new pain, numbness, tingling, weakness, gait disturbance, fever, chills, drainage from incision, redness at incision site.  4/2/24: Baylee Ferrara presents for interval follow up and imaging review for lumbar radiculopathy. She continues to have severe pain in her lower back, left greater than right side, radiating into her thighs. Her numbness partially improved with MARCUS. She additionally has the pain radiating down her posterior left leg and into the foot. Denies bowel or bladder difficulties.  She has been doing yoga and home PT exercises.   She underwent CT lumbar spine.  She underwent x-rays of the lumbar spine which demonstrates multilevel mild to moderate spondylosis. Mild grade 1 anterolisthesis of L3 on L4 with flexion which decreases slightly with extension.   3/19/24: BAYLEE FERRARA is a 63 year old female with a PMH of L3, L4, L5 spine surgery 2014 and 2015 (for back pain and tightness in legs), who presents to the office today for neurosurgical consultation at the request of Dr. Bell due to lumbar radiculopathy.   The pain is characterized as throbbing, burning, electric in nature.  It started over nine years ago. It is exacerbated by standing on concrete, prolonged walking, bending, lifting, worse in the morning, and relieved by rest and sitting in recliner.  It radiates down the lower back, gluteal, to bilateral lower extremities anterior to knee mostly and numbness in her toes, left greater than right. She has to sleep on right side. She can no longer walk prolonged distances without her legs feeling fatigued and has difficulty caring for her grandchildren. There has been no known trauma precipitating the pain.  The patient endorses numbness of extremities. Denies weakness of extremities, bowel or bladder incontinence and gait disturbance.  She has tried MARCUS (left L2-L3, L3-L4 on 2/26/24), PT and PT home exercises, and pain medications including Tizanidine, Ibuprofen, Tylenol, Gabapentin, in the last months without relief.  She has undergone imaging in the form of MRI lumbar spine which revealed areas of stenosis, most notable at the L2-L3, L3-L4 levels (see detailed report below)  cardiologist: Gerardo Clay [facility]   Surg Hx: Previous lumbar laminectomies and discectomies (right sided L3-4 and L4-5) Meds:  Buproprian, Colchicine, Fluoxetine, Metoprolol 50 mg, Rosuvastatin 20 mg, Synthroid, Repatha, Vitamin D Allergies: NKDA  Soc Hx: nonsmoker, occasional EtOH, lives with , works as  retired in 2018

## 2024-06-11 NOTE — ASSESSMENT
[FreeTextEntry1] : HILLARY FERRARA is a 63 year old female with a history of prior lumbar decompression surgery in 2014 and 2015 (L3-4 and L4-5) who initially presented to my office due to lumbar radiculopathy and neurogenic claudication. She is now s/p lumbar L2-L5 laminectomies, facetectomies, and foraminotomies, L2-3 discectomy, L2-5 posterolateral instrumented fusion and arthrodesis.  She was previously noted to have weak, but present, rectal tone on exam, with diminished sensation. She now reports some increased rectal tone and sensation.   We reviewed her latest x-rays in the office today. Her overall lumbar lordosis is well maintained and there are no issues seen with her hardware.   We will continue to monitor her left arm tingling sensation.  We will continue to monitor her rectal tone and brenna-anal sensation. She has been doing well since her lumbar L2-L5 laminectomies, facetectomies, and foraminotomies, L2-5 posterolateral instrumented fusion and arthrodesis on 4/24/24.  She was instructed to continue using fragrance-free shampoo to clean incision twice daily. She should continue the current pain regimen.  I have provided a prescription for outpatient PT.  She should return to the office in six weeks time for a progress check with xrays prior.  The patient understands the plan of care and is in agreement with it.    I spent 30 minutes relative to this patient encounter.

## 2024-07-23 ENCOUNTER — APPOINTMENT (OUTPATIENT)
Dept: NEUROSURGERY | Facility: CLINIC | Age: 64
End: 2024-07-23
Payer: MEDICARE

## 2024-07-23 ENCOUNTER — RESULT REVIEW (OUTPATIENT)
Age: 64
End: 2024-07-23

## 2024-07-23 VITALS
HEART RATE: 96 BPM | OXYGEN SATURATION: 98 % | HEIGHT: 64 IN | BODY MASS INDEX: 26.63 KG/M2 | SYSTOLIC BLOOD PRESSURE: 138 MMHG | WEIGHT: 156 LBS | DIASTOLIC BLOOD PRESSURE: 80 MMHG

## 2024-07-23 DIAGNOSIS — M48.062 SPINAL STENOSIS, LUMBAR REGION WITH NEUROGENIC CLAUDICATION: ICD-10-CM

## 2024-07-23 DIAGNOSIS — Z98.890 OTHER SPECIFIED POSTPROCEDURAL STATES: ICD-10-CM

## 2024-07-23 DIAGNOSIS — Z98.1 ARTHRODESIS STATUS: ICD-10-CM

## 2024-07-23 DIAGNOSIS — M54.16 RADICULOPATHY, LUMBAR REGION: ICD-10-CM

## 2024-07-23 PROCEDURE — 99024 POSTOP FOLLOW-UP VISIT: CPT

## 2024-07-24 NOTE — HISTORY OF PRESENT ILLNESS
[de-identified] : Baylee Ferrara presents s/p status post  L2-L5 laminectomies, facetectomies, and foraminotomies, L2-L5 posterolateral instrumented fusion and arthrodesis on 4/24/24. The numbness in her perianal area continues to improve since prior visit.  We reviewed her repeat x-rays prior to her appointment today. She is doing well with PT. She has intermittent tingling in the right lateral thigh and right lower extremity has been ongoing for a few weeks requiring Advil 800 mg for relief and PT. Her left leg feels stronger. She is doing more activity. Using heat and cold packs. The patient denies new pain, numbness, tingling, weakness, bowel/bladder dysfunction, gait disturbance, fever, chills, drainage from incision, redness at incision site.  6/11/24: Baylee Ferrara presents s/p lumbar L2-L5 laminectomies, facetectomies, and foraminotomies, L2-L5 posterolateral instrumented fusion and arthrodesis on 4/24/24. The numbness in the perianal area has partially improved since previous visit. We reviewed her repeat x-rays that were obtained prior to her appointment today. She has not yet started outpatient PT. She suffered a fall without injury two weeks ago. She reports tingling in her right thigh and lower extremities and tingling in left arm. The patient denies new pain, numbness, tingling, weakness, bowel/bladder dysfunction, gait disturbance, fever, chills, drainage from incision, redness at incision site.  5/3/24: Baylee Ferrara presents s/p Lumbar L2-L5 laminectomies, facetectomies, and foraminotomies, L2-5 posterolateral instrumented fusion and arthrodesis on 4/24/24. She has been doing well post op and taking the pain medication with positive relief. She notes numbness in the perianal area with difficulty going to the bathroom requiring multiple laxatives. Denies incontinence of bowel or bladder.  The patient denies new pain, numbness, tingling, weakness, gait disturbance, fever, chills, drainage from incision, redness at incision site.  4/2/24: Baylee Ferrara presents for interval follow up and imaging review for lumbar radiculopathy. She continues to have severe pain in her lower back, left greater than right side, radiating into her thighs. Her numbness partially improved with MARCUS. She additionally has the pain radiating down her posterior left leg and into the foot. Denies bowel or bladder difficulties.  She has been doing yoga and home PT exercises.   She underwent CT lumbar spine.  She underwent x-rays of the lumbar spine which demonstrates multilevel mild to moderate spondylosis. Mild grade 1 anterolisthesis of L3 on L4 with flexion which decreases slightly with extension.   3/19/24: BAYLEE FERRARA is a 63 year old female with a PMH of L3, L4, L5 spine surgery 2014 and 2015 (for back pain and tightness in legs), who presents to the office today for neurosurgical consultation at the request of Dr. Bell due to lumbar radiculopathy.   The pain is characterized as throbbing, burning, electric in nature.  It started over nine years ago. It is exacerbated by standing on concrete, prolonged walking, bending, lifting, worse in the morning, and relieved by rest and sitting in recliner.  It radiates down the lower back, gluteal, to bilateral lower extremities anterior to knee mostly and numbness in her toes, left greater than right. She has to sleep on right side. She can no longer walk prolonged distances without her legs feeling fatigued and has difficulty caring for her grandchildren. There has been no known trauma precipitating the pain.  The patient endorses numbness of extremities. Denies weakness of extremities, bowel or bladder incontinence and gait disturbance.  She has tried MARCUS (left L2-L3, L3-L4 on 2/26/24), PT and PT home exercises, and pain medications including Tizanidine, Ibuprofen, Tylenol, Gabapentin, in the last months without relief.  She has undergone imaging in the form of MRI lumbar spine which revealed areas of stenosis, most notable at the L2-L3, L3-L4 levels (see detailed report below)  cardiologist: Gerardo [facility]   Surg Hx: Previous lumbar laminectomies and discectomies (right sided L3-4 and L4-5) Meds:  Buproprian, Colchicine, Fluoxetine, Metoprolol 50 mg, Rosuvastatin 20 mg, Synthroid, Repatha, Vitamin D Allergies: NKDA  Soc Hx: nonsmoker, occasional EtOH, lives with , works as  retired in 2018

## 2024-07-24 NOTE — DATA REVIEWED
[de-identified] : 3/30 MRI lumbar spine [de-identified] : 1/29/24 MRI lumbar spine-see report  [de-identified] : 2/24/24 xrays lumbar spine-see scanned report

## 2024-07-24 NOTE — REASON FOR VISIT
[de-identified] : Lumbar L2-L5 laminectomies, facetectomies, and foraminotomies, L2-3 discectomy, L2-5 posterolateral instrumented fusion and arthrodesis  [de-identified] : 4/24/24  [de-identified] : 10

## 2024-07-24 NOTE — REASON FOR VISIT
[de-identified] : Lumbar L2-L5 laminectomies, facetectomies, and foraminotomies, L2-3 discectomy, L2-5 posterolateral instrumented fusion and arthrodesis  [de-identified] : 4/24/24  [de-identified] : 10

## 2024-07-24 NOTE — DATA REVIEWED
[de-identified] : 3/30 MRI lumbar spine [de-identified] : 1/29/24 MRI lumbar spine-see report  [de-identified] : 2/24/24 xrays lumbar spine-see scanned report

## 2024-07-24 NOTE — PHYSICAL EXAM
[General Appearance - Alert] : alert [General Appearance - In No Acute Distress] : in no acute distress [Longitudinal] : longitudinal [Clean] : clean [Dry] : dry [Intact] : intact [No Drainage] : without drainage [Fluency] : fluency intact [Comprehension] : comprehension intact [Cranial Nerves Optic (II)] : visual acuity intact bilaterally,  pupils equal round and reactive to light [Cranial Nerves Oculomotor (III)] : extraocular motion intact [Cranial Nerves Trigeminal (V)] : facial sensation intact symmetrically [Cranial Nerves Facial (VII)] : face symmetrical [Cranial Nerves Vestibulocochlear (VIII)] : hearing was intact bilaterally [Cranial Nerves Glossopharyngeal (IX)] : tongue and palate midline [Cranial Nerves Accessory (XI - Cranial And Spinal)] : head turning and shoulder shrug symmetric [Cranial Nerves Hypoglossal (XII)] : there was no tongue deviation with protrusion [Motor Strength] : muscle strength was normal in all four extremities [Sensation Tactile Decrease] : light touch was intact [Abnormal Walk] : normal gait [FreeTextEntry1] : lower back

## 2024-07-24 NOTE — REASON FOR VISIT
[de-identified] : Lumbar L2-L5 laminectomies, facetectomies, and foraminotomies, L2-3 discectomy, L2-5 posterolateral instrumented fusion and arthrodesis  [de-identified] : 4/24/24  [de-identified] : 10

## 2024-07-24 NOTE — ASSESSMENT
[FreeTextEntry1] : HILLARY FERRAAR is a 63 year old female with a history of prior lumbar decompression surgery in 2014 and 2015 (L3-4 and L4-5) who initially presented to my office due to lumbar radiculopathy and neurogenic claudication. She is now s/p lumbar L2-L5 laminectomies, facetectomies, and foraminotomies, L2-3 discectomy, L2-5 posterolateral instrumented fusion and arthrodesis on 4/25/24.  She was previously noted to have weak, but present, rectal tone on exam, with diminished sensation. She now reports continued increased rectal tone and sensation, and is much happier with the progress that she is making.   We reviewed her latest x-rays in the office today. Her overall lumbar lordosis is well maintained and there are no issues seen with her hardware.   She has been doing well since her lumbar L2-L5 laminectomies, facetectomies, and foraminotomies, L2-5 posterolateral instrumented fusion and arthrodesis on 4/24/24.  She was instructed to continue using fragrance-free shampoo to clean incision twice daily. She should continue the current pain regimen.  I have provided a prescription for outpatient PT.  She should return to the office in three months time for a progress check with xrays prior.  The patient understands the plan of care and is in agreement with it.    I spent 30 minutes relative to this patient encounter.

## 2024-07-24 NOTE — DATA REVIEWED
[de-identified] : 3/30 MRI lumbar spine [de-identified] : 1/29/24 MRI lumbar spine-see report  [de-identified] : 2/24/24 xrays lumbar spine-see scanned report

## 2024-07-24 NOTE — ASSESSMENT
[FreeTextEntry1] : HILLARY FERRARA is a 63 year old female with a history of prior lumbar decompression surgery in 2014 and 2015 (L3-4 and L4-5) who initially presented to my office due to lumbar radiculopathy and neurogenic claudication. She is now s/p lumbar L2-L5 laminectomies, facetectomies, and foraminotomies, L2-3 discectomy, L2-5 posterolateral instrumented fusion and arthrodesis on 4/25/24.  She was previously noted to have weak, but present, rectal tone on exam, with diminished sensation. She now reports continued increased rectal tone and sensation, and is much happier with the progress that she is making.   We reviewed her latest x-rays in the office today. Her overall lumbar lordosis is well maintained and there are no issues seen with her hardware.   She has been doing well since her lumbar L2-L5 laminectomies, facetectomies, and foraminotomies, L2-5 posterolateral instrumented fusion and arthrodesis on 4/24/24.  She was instructed to continue using fragrance-free shampoo to clean incision twice daily. She should continue the current pain regimen.  I have provided a prescription for outpatient PT.  She should return to the office in three months time for a progress check with xrays prior.  The patient understands the plan of care and is in agreement with it.    I spent 30 minutes relative to this patient encounter.

## 2024-07-24 NOTE — HISTORY OF PRESENT ILLNESS
[de-identified] : Baylee Ferrara presents s/p status post  L2-L5 laminectomies, facetectomies, and foraminotomies, L2-L5 posterolateral instrumented fusion and arthrodesis on 4/24/24. The numbness in her perianal area continues to improve since prior visit.  We reviewed her repeat x-rays prior to her appointment today. She is doing well with PT. She has intermittent tingling in the right lateral thigh and right lower extremity has been ongoing for a few weeks requiring Advil 800 mg for relief and PT. Her left leg feels stronger. She is doing more activity. Using heat and cold packs. The patient denies new pain, numbness, tingling, weakness, bowel/bladder dysfunction, gait disturbance, fever, chills, drainage from incision, redness at incision site.  6/11/24: Baylee Ferrara presents s/p lumbar L2-L5 laminectomies, facetectomies, and foraminotomies, L2-L5 posterolateral instrumented fusion and arthrodesis on 4/24/24. The numbness in the perianal area has partially improved since previous visit. We reviewed her repeat x-rays that were obtained prior to her appointment today. She has not yet started outpatient PT. She suffered a fall without injury two weeks ago. She reports tingling in her right thigh and lower extremities and tingling in left arm. The patient denies new pain, numbness, tingling, weakness, bowel/bladder dysfunction, gait disturbance, fever, chills, drainage from incision, redness at incision site.  5/3/24: Baylee Ferrara presents s/p Lumbar L2-L5 laminectomies, facetectomies, and foraminotomies, L2-5 posterolateral instrumented fusion and arthrodesis on 4/24/24. She has been doing well post op and taking the pain medication with positive relief. She notes numbness in the perianal area with difficulty going to the bathroom requiring multiple laxatives. Denies incontinence of bowel or bladder.  The patient denies new pain, numbness, tingling, weakness, gait disturbance, fever, chills, drainage from incision, redness at incision site.  4/2/24: Baylee Ferrara presents for interval follow up and imaging review for lumbar radiculopathy. She continues to have severe pain in her lower back, left greater than right side, radiating into her thighs. Her numbness partially improved with MARCUS. She additionally has the pain radiating down her posterior left leg and into the foot. Denies bowel or bladder difficulties.  She has been doing yoga and home PT exercises.   She underwent CT lumbar spine.  She underwent x-rays of the lumbar spine which demonstrates multilevel mild to moderate spondylosis. Mild grade 1 anterolisthesis of L3 on L4 with flexion which decreases slightly with extension.   3/19/24: BAYLEE FERRARA is a 63 year old female with a PMH of L3, L4, L5 spine surgery 2014 and 2015 (for back pain and tightness in legs), who presents to the office today for neurosurgical consultation at the request of Dr. Bell due to lumbar radiculopathy.   The pain is characterized as throbbing, burning, electric in nature.  It started over nine years ago. It is exacerbated by standing on concrete, prolonged walking, bending, lifting, worse in the morning, and relieved by rest and sitting in recliner.  It radiates down the lower back, gluteal, to bilateral lower extremities anterior to knee mostly and numbness in her toes, left greater than right. She has to sleep on right side. She can no longer walk prolonged distances without her legs feeling fatigued and has difficulty caring for her grandchildren. There has been no known trauma precipitating the pain.  The patient endorses numbness of extremities. Denies weakness of extremities, bowel or bladder incontinence and gait disturbance.  She has tried MARCUS (left L2-L3, L3-L4 on 2/26/24), PT and PT home exercises, and pain medications including Tizanidine, Ibuprofen, Tylenol, Gabapentin, in the last months without relief.  She has undergone imaging in the form of MRI lumbar spine which revealed areas of stenosis, most notable at the L2-L3, L3-L4 levels (see detailed report below)  cardiologist: Gerardo [facility]   Surg Hx: Previous lumbar laminectomies and discectomies (right sided L3-4 and L4-5) Meds:  Buproprian, Colchicine, Fluoxetine, Metoprolol 50 mg, Rosuvastatin 20 mg, Synthroid, Repatha, Vitamin D Allergies: NKDA  Soc Hx: nonsmoker, occasional EtOH, lives with , works as  retired in 2018

## 2024-07-24 NOTE — HISTORY OF PRESENT ILLNESS
[de-identified] : Baylee Ferrara presents s/p status post  L2-L5 laminectomies, facetectomies, and foraminotomies, L2-L5 posterolateral instrumented fusion and arthrodesis on 4/24/24. The numbness in her perianal area continues to improve since prior visit.  We reviewed her repeat x-rays prior to her appointment today. She is doing well with PT. She has intermittent tingling in the right lateral thigh and right lower extremity has been ongoing for a few weeks requiring Advil 800 mg for relief and PT. Her left leg feels stronger. She is doing more activity. Using heat and cold packs. The patient denies new pain, numbness, tingling, weakness, bowel/bladder dysfunction, gait disturbance, fever, chills, drainage from incision, redness at incision site.  6/11/24: Baylee Ferrara presents s/p lumbar L2-L5 laminectomies, facetectomies, and foraminotomies, L2-L5 posterolateral instrumented fusion and arthrodesis on 4/24/24. The numbness in the perianal area has partially improved since previous visit. We reviewed her repeat x-rays that were obtained prior to her appointment today. She has not yet started outpatient PT. She suffered a fall without injury two weeks ago. She reports tingling in her right thigh and lower extremities and tingling in left arm. The patient denies new pain, numbness, tingling, weakness, bowel/bladder dysfunction, gait disturbance, fever, chills, drainage from incision, redness at incision site.  5/3/24: Baylee Ferrara presents s/p Lumbar L2-L5 laminectomies, facetectomies, and foraminotomies, L2-5 posterolateral instrumented fusion and arthrodesis on 4/24/24. She has been doing well post op and taking the pain medication with positive relief. She notes numbness in the perianal area with difficulty going to the bathroom requiring multiple laxatives. Denies incontinence of bowel or bladder.  The patient denies new pain, numbness, tingling, weakness, gait disturbance, fever, chills, drainage from incision, redness at incision site.  4/2/24: Baylee Ferrara presents for interval follow up and imaging review for lumbar radiculopathy. She continues to have severe pain in her lower back, left greater than right side, radiating into her thighs. Her numbness partially improved with MARCUS. She additionally has the pain radiating down her posterior left leg and into the foot. Denies bowel or bladder difficulties.  She has been doing yoga and home PT exercises.   She underwent CT lumbar spine.  She underwent x-rays of the lumbar spine which demonstrates multilevel mild to moderate spondylosis. Mild grade 1 anterolisthesis of L3 on L4 with flexion which decreases slightly with extension.   3/19/24: BAYLEE FERRARA is a 63 year old female with a PMH of L3, L4, L5 spine surgery 2014 and 2015 (for back pain and tightness in legs), who presents to the office today for neurosurgical consultation at the request of Dr. Bell due to lumbar radiculopathy.   The pain is characterized as throbbing, burning, electric in nature.  It started over nine years ago. It is exacerbated by standing on concrete, prolonged walking, bending, lifting, worse in the morning, and relieved by rest and sitting in recliner.  It radiates down the lower back, gluteal, to bilateral lower extremities anterior to knee mostly and numbness in her toes, left greater than right. She has to sleep on right side. She can no longer walk prolonged distances without her legs feeling fatigued and has difficulty caring for her grandchildren. There has been no known trauma precipitating the pain.  The patient endorses numbness of extremities. Denies weakness of extremities, bowel or bladder incontinence and gait disturbance.  She has tried MARCUS (left L2-L3, L3-L4 on 2/26/24), PT and PT home exercises, and pain medications including Tizanidine, Ibuprofen, Tylenol, Gabapentin, in the last months without relief.  She has undergone imaging in the form of MRI lumbar spine which revealed areas of stenosis, most notable at the L2-L3, L3-L4 levels (see detailed report below)  cardiologist: Gerardo [facility]   Surg Hx: Previous lumbar laminectomies and discectomies (right sided L3-4 and L4-5) Meds:  Buproprian, Colchicine, Fluoxetine, Metoprolol 50 mg, Rosuvastatin 20 mg, Synthroid, Repatha, Vitamin D Allergies: NKDA  Soc Hx: nonsmoker, occasional EtOH, lives with , works as  retired in 2018

## 2024-10-25 ENCOUNTER — RESULT REVIEW (OUTPATIENT)
Age: 64
End: 2024-10-25

## 2024-11-06 ENCOUNTER — APPOINTMENT (OUTPATIENT)
Dept: NEUROSURGERY | Facility: CLINIC | Age: 64
End: 2024-11-06
Payer: MEDICARE

## 2024-11-06 VITALS
HEIGHT: 64 IN | OXYGEN SATURATION: 97 % | BODY MASS INDEX: 26.63 KG/M2 | SYSTOLIC BLOOD PRESSURE: 128 MMHG | WEIGHT: 156 LBS | HEART RATE: 72 BPM | DIASTOLIC BLOOD PRESSURE: 86 MMHG

## 2024-11-06 DIAGNOSIS — M48.062 SPINAL STENOSIS, LUMBAR REGION WITH NEUROGENIC CLAUDICATION: ICD-10-CM

## 2024-11-06 DIAGNOSIS — M54.16 RADICULOPATHY, LUMBAR REGION: ICD-10-CM

## 2024-11-06 DIAGNOSIS — Z98.1 ARTHRODESIS STATUS: ICD-10-CM

## 2024-11-06 PROCEDURE — 99215 OFFICE O/P EST HI 40 MIN: CPT
